# Patient Record
Sex: FEMALE | Race: WHITE | Employment: FULL TIME | URBAN - METROPOLITAN AREA
[De-identification: names, ages, dates, MRNs, and addresses within clinical notes are randomized per-mention and may not be internally consistent; named-entity substitution may affect disease eponyms.]

---

## 2021-02-01 DIAGNOSIS — Z23 NEED FOR VACCINATION: ICD-10-CM

## 2021-02-10 ENCOUNTER — IMMUNIZATION (OUTPATIENT)
Dept: LAB | Age: 26
End: 2021-02-10
Attending: HOSPITALIST
Payer: COMMERCIAL

## 2021-02-10 DIAGNOSIS — Z23 NEED FOR VACCINATION: Primary | ICD-10-CM

## 2021-02-10 PROCEDURE — 0001A SARSCOV2 VAC 30MCG/0.3ML IM: CPT

## 2021-03-03 ENCOUNTER — IMMUNIZATION (OUTPATIENT)
Dept: LAB | Age: 26
End: 2021-03-03
Attending: HOSPITALIST
Payer: COMMERCIAL

## 2021-03-03 DIAGNOSIS — Z23 NEED FOR VACCINATION: Primary | ICD-10-CM

## 2021-03-03 PROCEDURE — 0002A SARSCOV2 VAC 30MCG/0.3ML IM: CPT

## 2025-01-09 NOTE — PLAN OF CARE
Problem: Patient/Family Goals  Goal: Patient/Family Long Term Goal  Description: Patient's Long Term Goal:     Interventions:  - Uncomplicated vaginal delivery    Interventions:  VS per protocol  I&O  Ice chips and sips as tolerated  EFM per protocol  Maintain IV as ordered  Antibiotics as needed per protocol  Informed consent  - See additional Care Plan goals for specific interventions  Outcome: Progressing  Goal: Patient/Family Short Term Goal  Description: Patient's Short Term Goal:     Interventions: Adequate pain control with delivery of infant  Interventions:  Pain assessment scores as ordered  Patient scores pain a \"3\" or less  Multidisciplinary care   Nonpharmacologic comfort measures  -   - See additional Care Plan goals for specific interventions  Outcome: Progressing

## 2025-01-09 NOTE — NST
Nonstress Test   Patient: Jamila Uribe    Gestation: 37w3d    NST:       Variability: Moderate           Accelerations: Yes           Decelerations: Variable (x2 20 sec's)            Baseline: 125 BPM           Uterine Irritability: No           Contractions: Irregular                    Contraction Frequency: 6-8                   Acoustic Stimulator: No           Nonstress Test Interpretation: Reactive           Nonstress Test Second Interpretation: Reactive                     Additional Comments

## 2025-01-09 NOTE — H&P
[facility]  History & Physical    Jamila Uribe Patient Status:  Inpatient    9/15/1995 MRN [id number]   Location [facility] LABOR & DELIVERY Attending Thu Holloway,    Hosp Day # 0 PCP Linnea Rivera DO     SUBJECTIVE:    Jamila Uribe is a 29 year old  at 37w3d who is being admitted for labor management.  Started luis last evening around 5pm, came in over night for labor but was sent home after no cervical change. Contractions intensified and came back in, was 5cm at the time, BBOW.    Pregnancy complications:  GBS+    Obstetric History:   OB History    Para Term  AB Living   1 0 0 0 0 0   SAB IAB Ectopic Multiple Live Births   0 0 0 0        # Outcome Date GA Lbr Ganesh/2nd Weight Sex Type Anes PTL Lv   1 Current              Past Medical History: History reviewed. No pertinent past medical history.  Past Social History:   Past Surgical History:   Procedure Laterality Date    T&a      Tonsillectomy  age 5     Family History:   Family History   Problem Relation Age of Onset    Cancer Maternal Grandmother 75        ovarian    Heart Disease Father     High Blood Pressure Father     High Cholesterol Father     Heart Disorder Father 55        MI    Cancer Paternal Grandmother 55        ovarian    Cancer Brother 38        pancreatic     Social History:   Social History     Tobacco Use    Smoking status: Never    Smokeless tobacco: Never   Substance Use Topics    Alcohol use: Not Currently     Comment: social       Home Meds: Prescriptions Prior to Admission[1]  Allergies: Allergies[2]    OBJECTIVE:    Temp:  [98.1 °F (36.7 °C)-99.2 °F (37.3 °C)] 99.2 °F (37.3 °C)  Pulse:  [74-97] 84  Resp:  [16-18] 18  BP: ()/(47-88) 104/61  SpO2:  [98 %-100 %] 100 %    Lungs:   Unlabored   Heart:   Regular rate   Abdomen:  Gravid, soft and non-tender, +FHT   Fetal Surveillance: NST reactive   Cervix: 5 on admission, now 8/100/0     Lab Review:   O+  GBS+    Lab Results    Component Value Date    WBC 20.9 2025    HGB 12.3 2025    HCT 37.8 2025    .0 2025          ASSESSMENT:   at 37w3d admitted for labor    PLAN:  1. Admit to L&D  2. Anticipate vaginal delivery  3. GBS pos-- on antibiotics  4. AROM, clear fluid noted  5. Comfortable with epidural    Electronically signed by: Thu Holloway DO 2025 11:53 AM           [1]   Medications Prior to Admission   Medication Sig Dispense Refill Last Dose/Taking    prenatal vitamin with DHA 27-0.8-228 MG Oral Cap Take 1 capsule by mouth daily.   Past Week    ferrous sulfate 325 (65 FE) MG Oral Tab EC Take 1 tablet (325 mg total) by mouth daily with breakfast.   Past Week    Meloxicam 15 MG Oral Tab Take 1 tablet (15 mg total) by mouth daily. (Patient not taking: Reported on 2025) 30 tablet 0     escitalopram 10 MG Oral Tab Take 10 mg by mouth daily. (Patient not taking: Reported on 2025)       amphetamine-dextroamphetamine 20 MG Oral Tab Take 10 mg by mouth daily. (Patient not taking: Reported on 2025)       Norethin Ace-Eth Estrad-FE (MINASTRIN 24 FE OR) Take 24 mg by mouth one time. (Patient not taking: Reported on 2025)      [2]   Allergies  Allergen Reactions    Cefzil RASH

## 2025-01-09 NOTE — PROGRESS NOTES
Discharged to home per ambulatory in stable condition with written and verbal instructions. Kick counts reviewed. Labor precautions reviewed. Patient verbalizes understanding of information given.

## 2025-01-09 NOTE — PROGRESS NOTES
Pt is a 29 year old female admitted to TRG4/TRG4-A.     Chief Complaint   Patient presents with    R/o Labor     Ctx started around 1430 with increased intensity and frequency since . Ctx about every 5-10 min. +Fm, denies lof, bleeding, some mucous discharge      Pt is  37w2d intra-uterine pregnancy.  History obtained, consents signed. Oriented to room, staff, and plan of care.

## 2025-01-09 NOTE — DISCHARGE INSTRUCTIONS
Kick Counts  It’s normal to worry about your baby’s health. Generally, you will feel your baby start to move in your 2nd trimester at around 16 to 24 weeks. Getting to know the pattern of your baby's movements is one way to know what's normal for you and baby. This is called a kick count. Talk with your healthcare provider about kick counts and your specific situation. Always follow your provider's instructions.   How to count kicks    Here is just one way to do kick counts. Always follow your healthcare provider's instructions. Starting at 28 weeks, count your baby's movements daily. Time how long it takes you to feel 10 kicks, flutters, swishes, or rolls. Ideally, you want to feel at least 10 movements in 2 hours. You will likely feel 10 movements in less time than that.   Here are tips for counting kicks:   Choose a time when the baby is active, such as after a meal.   Sit comfortably or lie on your side.   The first time the baby moves, write down the time.   Count each movement until the baby has moved  10 times. This can take from 20 minutes to 2 hours.   If you haven't felt 10 kicks by the end of the second hour, wait a few hours. Then try again.  Try to do it at the same time each day.  When to call your healthcare provider  Follow your provider's instructions about when to call about your baby's movements. Don't hesitate to call if you have concerns.   Call your healthcare provider  right away if:   You do a couple sets of kick counts during the day and your baby moves fewer than 10 times in 2 hours.  Your baby moves much less often than on the days before.  You haven't felt your baby move all day.  [employer] last reviewed this educational content on 12/1/2022  © 8146-7131 [employer]. All rights reserved. This information is not intended as a substitute for professional medical care. Always follow your healthcare professional's instructions.      Discharge Instructions    Diet:  Reg  Activity: Normal activity         General Instructions    Call your OB doctor if: Fluid leaking from your vagina;Uterine contractions increasing in intensity and frequency;Vaginal bleeding;Vaginal or rectal pressure;Temperature greater than 100F;Decrease in fetal movement;Uterine contractions 10 minutes or closer for 1 to 2 hours  Early labor comfort measures: Drink fluids and eat small light meals;Relax, sleep, take a warm bath or shower for 30 minutes or less;Take a walk

## 2025-01-09 NOTE — ANESTHESIA PROCEDURE NOTES
Labor Analgesia    Date/Time: 1/9/2025 9:02 AM    Performed by: Cari Villanueva MD  Authorized by: Cari Villanueva MD      General Information and Staff    Start Time:  1/9/2025 9:02 AM  End Time:  1/9/2025 9:08 AM  Anesthesiologist:  Cari Villanueva MD  Performed by:  Anesthesiologist  Patient Location:  OB  Site Identification: surface landmarks    Reason for Block: labor epidural    Preanesthetic Checklist: patient identified, IV checked, risks and benefits discussed, monitors and equipment checked, pre-op evaluation, timeout performed, IV bolus, anesthesia consent and sterile technique used      Procedure Details    Patient Position:  Sitting  Prep: ChloraPrep    Monitoring:  Heart rate and continuous pulse ox  Approach:  Midline    Epidural Needle    Injection Technique:  JAVY saline  Needle Type:  Tuohy  Needle Gauge:  17 G  Needle Length:  3.375 in  Needle Insertion Depth:  5  Location:  L4-5    Spinal Needle      Catheter    Catheter Type:  End hole  Catheter Size:  19 G  Catheter at Skin Depth:  10  Test Dose:  Negative    Assessment    Sensory Level:  T10    Additional Comments           
See provided instructions

## 2025-01-09 NOTE — ANESTHESIA PREPROCEDURE EVALUATION
PRE-OP EVALUATION    Patient Name: Jamila Uribe    Admit Diagnosis: Pregnancy (HCC) [Z34.90]    Pre-op Diagnosis: * No surgery found *        Anesthesia Procedure: LABOR ANALGESIA    * Surgery not found *    Pre-op vitals reviewed.  Temp: 99.2 °F (37.3 °C)  Pulse: 79  Resp: 18  BP: 94/47  SpO2: 100 %  Body mass index is 27.44 kg/m².    Current medications reviewed.  Hospital Medications:   lactated ringers infusion   Intravenous Continuous    dextrose in lactated ringers 5% infusion   Intravenous PRN    lactated ringers IV bolus 500 mL  500 mL Intravenous PRN    acetaminophen (Tylenol Extra Strength) tab 500 mg  500 mg Oral Q6H PRN    acetaminophen (Tylenol Extra Strength) tab 1,000 mg  1,000 mg Oral Q6H PRN    ibuprofen (Motrin) tab 600 mg  600 mg Oral Once PRN    ondansetron (Zofran) 4 MG/2ML injection 4 mg  4 mg Intravenous Q6H PRN    oxyTOCIN in sodium chloride 0.9% (Pitocin) 30 Units/500mL infusion premix  62.5-900 chioma-units/min Intravenous Continuous    terbutaline (Brethine) 1 MG/ML injection 0.25 mg  0.25 mg Subcutaneous PRN    sodium citrate-citric acid (Bicitra) 500-334 MG/5ML oral solution 30 mL  30 mL Oral PRN    [COMPLETED] ceFAZolin (Ancef) 2g in 10mL IV syringe premix  2 g Intravenous Once    Followed by    ceFAZolin (Ancef) 1 g in dextrose 5% 100mL IVPB-ADD  1 g Intravenous Q8H    [COMPLETED] lactated ringers IV bolus 1,000 mL  1,000 mL Intravenous Once    fentaNYL-bupivacaine 2 mcg/mL-0.125% in sodium chloride 0.9% 100 mL EPIDURAL infusion premix  12 mL/hr Epidural Continuous    fentaNYL (Sublimaze) 50 mcg/mL injection 100 mcg  100 mcg Epidural Once    lidocaine 1.5%-EPINEPHrine 1:200,000 (Xylocaine-Epinephrine) injection  5 mL Injection PRN    bupivacaine PF (Marcaine) 0.25% injection  30 mL Injection PRN    lidocaine PF (Xylocaine-MPF) 2% injection  5 mL Injection PRN    sodium chloride 0.9% PF injection 10 mL  10 mL Injection PRN    ePHEDrine (PF) 25 MG/5 ML injection 5 mg  5 mg  Intravenous PRN    nalbuphine (Nubain) 10 mg/mL injection 2.5 mg  2.5 mg Intravenous Q15 Min PRN       Outpatient Medications:   Prescriptions Prior to Admission[1]    Allergies: Cefzil      Anesthesia Evaluation    Patient summary reviewed.    Anesthetic Complications           GI/Hepatic/Renal    Negative GI/hepatic/renal ROS.                             Cardiovascular    Negative cardiovascular ROS.                                                   Endo/Other    Negative endo/other ROS.                              Pulmonary    Negative pulmonary ROS.                       Neuro/Psych    Negative neuro/psych ROS.                                  Past Surgical History:   Procedure Laterality Date    T&a      Tonsillectomy  age 5     Social History     Socioeconomic History    Marital status:    Tobacco Use    Smoking status: Never    Smokeless tobacco: Never   Vaping Use    Vaping status: Never Used   Substance and Sexual Activity    Alcohol use: Not Currently     Comment: social    Drug use: No    Sexual activity: Never     History   Drug Use No     Available pre-op labs reviewed.  Lab Results   Component Value Date    WBC 20.9 (H) 01/09/2025    RBC 4.33 01/09/2025    HGB 12.3 01/09/2025    HCT 37.8 01/09/2025    MCV 87.3 01/09/2025    MCH 28.4 01/09/2025    MCHC 32.5 01/09/2025    RDW 13.0 01/09/2025    .0 01/09/2025               Airway      Mallampati: II  Mouth opening: >3 FB  TM distance: 4 - 6 cm  Neck ROM: full Cardiovascular      Rhythm: regular  Rate: normal     Dental             Pulmonary    Pulmonary exam normal.                 Other findings              ASA: 2   Plan: epidural             Plan/risks discussed with: patient and spouse                Present on Admission:  **None**             [1]   Medications Prior to Admission   Medication Sig Dispense Refill Last Dose/Taking    prenatal vitamin with DHA 27-0.8-228 MG Oral Cap Take 1 capsule by mouth daily.   Past Week    ferrous  sulfate 325 (65 FE) MG Oral Tab EC Take 1 tablet (325 mg total) by mouth daily with breakfast.   Past Week    Meloxicam 15 MG Oral Tab Take 1 tablet (15 mg total) by mouth daily. (Patient not taking: Reported on 1/8/2025) 30 tablet 0     escitalopram 10 MG Oral Tab Take 10 mg by mouth daily. (Patient not taking: Reported on 1/8/2025)       amphetamine-dextroamphetamine 20 MG Oral Tab Take 10 mg by mouth daily. (Patient not taking: Reported on 1/8/2025)       Norethin Ace-Eth Estrad-FE (MINASTRIN 24 FE OR) Take 24 mg by mouth one time. (Patient not taking: Reported on 1/8/2025)

## 2025-01-10 NOTE — PROGRESS NOTES
Patient up to bathroom with assist.  Voided 250. Patient transferred to mother/baby room  per wheelchair in stable condition with baby and personal belongings.  Accompanied by significant other and staff.  Report given to mother/baby RN.

## 2025-01-10 NOTE — PROGRESS NOTES
Labor Analgesia Follow Up Note    Patient underwent epidural anesthesia for labor analgesia,    Placenta Date/Time: 1/9/2025  7:38 PM    Delivery Date/Time:: 1/9/2025  7:34 PM    /67 (BP Location: Right arm)   Pulse 79   Temp 98 °F (36.7 °C) (Oral)   Resp 18   Wt 68 kg (150 lb)   LMP 04/22/2024   SpO2 100%   Breastfeeding Yes   BMI 27.44 kg/m²     Assessment:  Patient seen and no apparent anesthesia related complications.    Thank you for asking us to participate in the care of your patient.

## 2025-01-10 NOTE — L&D DELIVERY NOTE
Lawson Uribe [[id number]]      Labor Events     labor?: No   steroids?: None  Antibiotics received during labor?: Yes  Antibiotics (enter # doses in comment): cefazolin (Comment: ancef x2)  Rupture date/time: 2025 1149     Rupture type: AROM  Fluid color: Clear  Labor type: Spontaneous Onset of Labor  Augmentation: AROM, Oxytocin  Indications for augmentation: Ineffective Contraction Pattern  Intrapartum & labor complications: Group B beta strep +       Labor Event Times    Dilation complete date/time: 2025 1520  Start pushing date/time: 2025 1534        Presentation    Presentation: Vertex  Position: Middle Occiput Transverse       Operative Delivery    Operative Vaginal Delivery: No                Shoulder Dystocia    Shoulder Dystocia: No       Anesthesia    Method: Epidural              [place] Delivery      Head delivery date/time: 2025 19:34:17   Delivery date/time:  25 19:34:38   Delivery type: Normal spontaneous vaginal delivery    Details:     Delivery location: delivery room       Delivery Providers    Delivering Clinician: Thu Holloway DO   Delivery personnel:  Provider Role   Adina Perez, RN Baby Nurse   Mala Carter RN Delivery Nurse             Cord    Vessels: 3 Vessels  Complications: Body cord  Timed cord clamping: Yes  Time in sec: 60  Cord blood disposition: to lab  Gases sent?: No       Resuscitation    Method: None        Measurements      Weight: 3000 g 6 lb 9.8 oz Length: 48.3 cm     Head circum.: 33 cm Chest circum.: 30.5 cm      Abdominal circum.: 29.5 cm           Placenta    Date/time: 2025 1938  Removal: Spontaneous  Appearance: Intact  Disposition: held for future pathology       Apgars    Living status: Living   Apgar Scoring Key:    0 1 2    Skin color Blue or pale Acrocyanotic Completely pink    Heart rate Absent <100 bpm >100 bpm    Reflex irritability No response Grimace Cry or active withdrawal    Muscle  tone Limp Some flexion Active motion    Respiratory effort Absent Weak cry; hypoventilation Good, crying              1 Minute:  5 Minute:  10 Minute:  15 Minute:  20 Minute:      Skin color: 0  1       Heart rate: 2  2       Reflex irritablity: 2  2       Muscle tone: 2  2       Respiratory effort: 2  2       Total: 8  9          Apgars assigned by: BRODY CAGLE  Nemours disposition: with mother       Skin to Skin    No data filed       Vaginal Count    Initial count RN: Brooklynn Carlson RN  Initial count Tech: Susie Harding    Initial counts 11   0    Final counts 11   3    Final count RN: Mala Carter RN  Final count MD: Thu Holloway, DO       Lacerations    Episiotomy: None  Perineal lacerations: 2nd Repaired?: Yes     Vaginal laceration?: No      Cervical laceration?: No    Clitoral laceration?: No                  Delivery Summary    Patient was complete and pushing for 4 hours. Head delivered spontaneously.  She then delivered a viable baby boy through a body cord via  without difficulty. The baby was vigorous and was bulb suctioned. The baby was placed on the mother's abdomen. The cord was clamped and cut after a 60 second delay per patient request and cord blood was collected. The placenta delivered spontaneously. Her uterus was massaged and was firm. She had a 2nd degree laceration repaired with 3-0 vicryl rapide suture. She tolerated the procedure well. A skin tag on the right gluteus was removed with a scalpel. Silver nitrate applied, hemostasis assured.  mL.    Liveborn male infant  Weight 6#10  Apgars: 8/9

## 2025-01-10 NOTE — PLAN OF CARE
Problem: Patient/Family Goals  Goal: Patient/Family Long Term Goal  Description: Patient's Long Term Goal: safe uncomplicated delivery  - See additional Care Plan goals for specific interventions  Outcome: Completed  Goal: Patient/Family Short Term Goal  Description: Patient's Short Term Goal: adequate pain control   - See additional Care Plan goals for specific interventions  Outcome: Completed     Problem: BIRTH - VAGINAL/ SECTION  Goal: Fetal and maternal status remain reassuring during the birth process  Description: INTERVENTIONS:  - Monitor vital signs  - Monitor fetal heart rate  - Monitor uterine activity  - Monitor labor progression (vaginal delivery)  - DVT prophylaxis (C/S delivery)  - Surgical antibiotic prophylaxis (C/S delivery)  Outcome: Completed     Problem: PAIN - ADULT  Goal: Verbalizes/displays adequate comfort level or patient's stated pain goal  Description: INTERVENTIONS:  - Encourage pt to monitor pain and request assistance  - Assess pain using appropriate pain scale  - Administer analgesics based on type and severity of pain and evaluate response  - Implement non-pharmacological measures as appropriate and evaluate response  - Consider cultural and social influences on pain and pain management  - Manage/alleviate anxiety  - Utilize distraction and/or relaxation techniques  - Monitor for opioid side effects  - Notify MD/LIP if interventions unsuccessful or patient reports new pain  - Anticipate increased pain with activity and pre-medicate as appropriate  Outcome: Completed     Problem: ANXIETY  Goal: Will report anxiety at manageable levels  Description: INTERVENTIONS:  - Administer medication as ordered  - Teach and rehearse alternative coping skills  - Provide emotional support with 1:1 interaction with staff  Outcome: Completed

## 2025-01-10 NOTE — PROGRESS NOTES
Admitted to mother/baby room 2193 in stable condition. Postpartum education initiated. Bed in low position, call light in reach. Instructed pt to call for assistance when getting out of bed. Updated on plan of care. Voiced understanding.

## 2025-01-10 NOTE — PROGRESS NOTES
OB Progress Note PPD#1  S: Feels well. Ambulating, eating. Pain controlled. Minimal VB.  Breastfeeding.    O:   Blood pressure 103/67, pulse 79, temperature 98 °F (36.7 °C), temperature source Oral, resp. rate 18, weight 150 lb (68 kg), last menstrual period 2024, SpO2 100%, currently breastfeeding.  Gen: NAD, AAOx3  Exam per RN  Breasts: soft, nontender, nonerythematous  Abdomen: soft, nontender, nondistended, fundus firm and nontender  Gyne: moderate lochia  Ext: trace edema      Lab Results  Lab Results   Component Value Date    WBC 24.8 01/10/2025    HGB 10.2 01/10/2025    HCT 30.2 01/10/2025    .0 01/10/2025     Recent Labs   Lab 25  0809 01/10/25  0820   RBC 4.33 3.42*   HGB 12.3 10.2*   HCT 37.8 30.2*   MCV 87.3 88.3   MCH 28.4 29.8   MCHC 32.5 33.8   RDW 13.0 12.9   NEPRELIM 17.95* 20.58*   WBC 20.9* 24.8*   .0 304.0           A/P: PPD#1 s/p , doing well  1) Pain: motrin PRN; advised to ask for pain medication regularly. Counseled on use of dermoplast and ice for perineum  2) Breastfeeding: given encouragement and support; lactation consult PRN  3) CV/resp: hemodynamically stable  4) GI: general diet  5) /gyne: voiding well; normal lochia  6) Heme: asymptomatic anemia  7) DVT ppx: ambulating  8) Mood: happy  9) Fluids/elec: IV out  10) Other: circumcision done  Anticipate discharge tomorrow    Greer Lester (previously Bradmnalmita) MD [facility]  Contact via [employer]

## 2025-01-11 NOTE — DISCHARGE INSTRUCTIONS
Post Vaginal Delivery Home Care Instructions       We hope you were pleased with your care at [facility].  We wish you the best outcome and overall experience with the delivery of your baby.  These instructions will help to minimize pain, limit the risk for an infection, and improve the likelihood of a successful recovery.    What to Expect:  Abdominal cramping after delivery especially if you are breastfeeding.   Vaginal bleeding for about 4-6 weeks that may be followed by a yellow or white discharge for a few more weeks.  Your period will resume in approximately 6-8 weeks, unless you are breastfeeding.    If you are bottle feeding, you may notice breast engorgement in about 3 days.  Your breast may be sore and hard. Please wear a tight fitted bra or sports bra for 24-36 hours to help prevent your breast from producing milk, and use ice packs to relive any discomfort.  If you are breastfeeding, nipple dryness is very common the first few days.    Constipation is common after having a baby.  Please increase fluid and fiber in your diet.      Over-The-Counter Medication  Non-prescription anti-inflammatory medications can also help to ease the pain.  You may take Aleve, Tylenol or Ibuprofen   Colace or Metamucil for Constipation  Lanolin for dry nipples  Tucks, Witch Hazel and Epifoam for Episiotomy discomfort.   Drink a full glass of water with oral medication and take as directed.    Wound Care  The following instructions will promote proper healing and help to prevent infection  Episiotomy Care: Sitz Bath for 15mins, 2-3 times a day,    Bathing/Showers  You may resume showers  No baths, swimming, hot tubs until your post-partum visit    Home Medication  Resume your home medications as instructed    Diet  Resume your normal diet    Activity  Refrain from vaginal intercourse, vaginal suppositories, tampon use or douches until after your post-partum visit.  No exercising for 4 weeks  You may climb stairs  minimally for the 1st week.    Do not do heavy housework for at least 2-3 weeks    Return to Work or School  You may return to work in approximately 6 weeks  Contact your obstetrician’s office, if you need a medical release.     Driving  Avoid driving if you are taking narcotics for pain relief.    Follow-up Appointment with Your Obstetrician  Call your obstetrician’s office today for an appointment in ______  weeks.    Verify your appointment date, day, time, and location.  At your 1st post-partum office visit:  Your progress will be evaluated, findings reviewed, and any additional concerns and instructions will be discussed.    Questions or Concerns  Call your obstetrician’s office if you experience the following:  Severe pain not controlled by pain medication  Too much pain when touched; yellowish, greenish, or bloody discharge, foul smelling vaginal discharge, cut site opens up  Heavy bleeding,problems with pain that does not go away or gets worse  Shortness of breath or sudden onset of chest pain  Fever of 100.4 F (38 C) or higher, chills, warmth around wound that will not heal  Redness, increased swelling or drainage from your incision  Crying and periods of sadness that prevents you from caring for yourself and your baby or you feel like harming yourself or baby.  Burning sensation during urination or inability to urinate or have bowel movements  Swelling, redness or abnormal warmth to your leg/calf  Swollen, hard, or painful breasts  You are not feeling better in 2 to 3 days, or are feeling increasingly worse  If your call is made after office hours, a physician will be available to help you.  There is always a provider covering our patients.                    MEDICATIONS offered/used during your stay:    @ MOTRIN (Ibuprofin 600mg)   1 tab every 6 hours as needed for pain   Last taken at:   --> Next dose due at:       @ TYLENOL (Acetaminophen 500)   1-2 tabs, every 6 hours, as needed for increased  pain.  Last taken at:   --> Next dose due at:      @ COLACE (Docusate Sodium 100mg)  1 tab two times per day as needed for stool softening   (once in am/once in pm)  Last taken at:   --> Next dose due at:      @ SIMETHICONE (Mylicon 80mg) Chewable Tab   1 tab daily as needed for gas.  Last taken at:    @ DERMOPLAST (Pain Relieving Spray)   Use as needed for perineal discomfort    @ TUCKS (Witch-Digna Glycerin pads)   Use as needed for hemorrhoids and/or perineal discomfort    Please see your Parent-Infant instruction pamphlet in your white [place] folder for further reference/review/instructions.  Thank you for coming to [facility].  We hope you've enjoyed your experience here.

## 2025-01-11 NOTE — PROGRESS NOTES
OB Progress Note PPD#2  S: Feels well. Ambulating, eating. Pain controlled. Minimal VB.  Breastfeeding.    O:   Blood pressure 111/80, pulse 78, temperature 98.2 °F (36.8 °C), temperature source Oral, resp. rate 16, weight 150 lb (68 kg), last menstrual period 2024, SpO2 100%, not currently breastfeeding.  Gen: NAD, AAOx3  Exam per RN  Abdomen: soft, nontender, nondistended, fundus firm and nontender  Gyne: moderate lochia  Ext: trace edema      Lab Results  Lab Results   Component Value Date    WBC 13.2 2025    HGB 9.8 2025    HCT 29.6 2025    .0 2025     Recent Labs   Lab 25  0809 01/10/25  0820 25  0749   RBC 4.33 3.42* 3.39*   HGB 12.3 10.2* 9.8*   HCT 37.8 30.2* 29.6*   MCV 87.3 88.3 87.3   MCH 28.4 29.8 28.9   MCHC 32.5 33.8 33.1   RDW 13.0 12.9 13.2   NEPRELIM 17.95* 20.58* 9.63*   WBC 20.9* 24.8* 13.2*   .0 304.0 271.0           A/P: PPD#2 s/p , doing well  1) Pain: motrin PRN; advised to ask for pain medication regularly. Counseled on use of dermoplast and ice for perineum  2) Breastfeeding: given encouragement and support; lactation consult PRN  3) CV/resp: hemodynamically stable  4) GI: general diet  5) /gyne: voiding well; normal lochia  6) Heme: asymptomatic anemia  7) DVT ppx: ambulating  8) Mood: happy  9) Fluids/elec: IV out  10) Other: circumcision done      Discharge home, instructions reviewed    Rox Cannon MD  OBGYN

## 2025-01-14 NOTE — PROGRESS NOTES
UNABLE TO REACH MOM AT THIS TIME.  LEFT MESSAGE TO CHECK [employer] FOR ADDITIONAL INFORMATION AND TO CONTACT [facility] OFFICE WITH ANY URGENT QUESTIONS AND CONCERNS.  WILL ATTEMPT TO CALL BACK AT A LATER TIME.